# Patient Record
Sex: FEMALE | Race: BLACK OR AFRICAN AMERICAN | NOT HISPANIC OR LATINO | Employment: FULL TIME | URBAN - METROPOLITAN AREA
[De-identification: names, ages, dates, MRNs, and addresses within clinical notes are randomized per-mention and may not be internally consistent; named-entity substitution may affect disease eponyms.]

---

## 2019-02-15 ENCOUNTER — HOSPITAL ENCOUNTER (EMERGENCY)
Facility: HOSPITAL | Age: 29
Discharge: HOME | End: 2019-02-15
Attending: EMERGENCY MEDICINE | Admitting: EMERGENCY MEDICINE

## 2019-02-15 VITALS
DIASTOLIC BLOOD PRESSURE: 67 MMHG | BODY MASS INDEX: 27.42 KG/M2 | HEIGHT: 62 IN | OXYGEN SATURATION: 98 % | WEIGHT: 149 LBS | HEART RATE: 57 BPM | RESPIRATION RATE: 16 BRPM | TEMPERATURE: 99.2 F | SYSTOLIC BLOOD PRESSURE: 115 MMHG

## 2019-02-15 DIAGNOSIS — F25.9 SCHIZOAFFECTIVE DISORDER, UNSPECIFIED TYPE (HCC): Primary | ICD-10-CM

## 2019-02-15 LAB
ETHANOL BLD-MCNC: <10 MG/DL (ref 0–10)
ETHANOL UR QL: <0.01 %
HCG SERPL QL: NEGATIVE

## 2019-02-15 PROCEDURE — 99285 EMERGENCY DEPT VISIT HI MDM: CPT

## 2019-02-15 PROCEDURE — 84703 CHORIONIC GONADOTROPIN ASSAY: CPT | Performed by: EMERGENCY MEDICINE

## 2019-02-15 PROCEDURE — 80307 DRUG TEST PRSMV CHEM ANLYZR: CPT | Performed by: EMERGENCY MEDICINE

## 2019-02-15 PROCEDURE — 90791 PSYCH DIAGNOSTIC EVALUATION: CPT

## 2019-02-15 RX ORDER — OLANZAPINE 10 MG/1
10 TABLET, ORALLY DISINTEGRATING ORAL ONCE
Status: COMPLETED | OUTPATIENT
Start: 2019-02-15 | End: 2019-02-15

## 2019-02-15 RX ORDER — ARIPIPRAZOLE 10 MG/1
10 TABLET ORAL DAILY
Qty: 30 TABLET | Refills: 0 | Status: SHIPPED | OUTPATIENT
Start: 2019-02-15

## 2019-02-15 RX ORDER — SODIUM CHLORIDE 0.9 % (FLUSH) 0.9 %
10 SYRINGE (ML) INJECTION AS NEEDED
Status: DISCONTINUED | OUTPATIENT
Start: 2019-02-15 | End: 2019-02-15 | Stop reason: HOSPADM

## 2019-02-15 RX ADMIN — OLANZAPINE 10 MG: 10 TABLET, ORALLY DISINTEGRATING ORAL at 01:49

## 2019-02-15 NOTE — CONSULTS
"29 yo black female evaluated in ED (Room#9). Patient's sister and father Asaf at bedside. Patient drowsy but able to answer questions appropriately. Diagnosed with schizoaffective disorder approximately 3 years ago after starting college. Prescribed abilify which was effective but lost her job so and insurance so hasn't been able to refill it. States she has been out of it for a month. Father states patient also will quit taking meds at times due to \"feeling better\" and not wanting to be on medication. History of inpatient psychiatric treatment at [facility] and [facility]. Father states mental illness is prevalent on patient's maternal side. Patient resides with her sister and 2 cousins. Currently not working. Patient has some college.     Patient alert and oriented times 4. Pleasant, calm and cooperative. States she felt like she was having a panic attack earlier. Give zyprexa 10mg in ED which was effective. Patient denies SI. Denies HI. No psychosis. States she is currently with [facility]. Spoke with patient re importance of medication compliance. Spoke with Dr. Loaiza re plan of care. Patient does not meet criteria for inpatient psychiatric admission. Will refer back to [facility]. Patient's father and sister agreeable with taking patient home.  "

## 2019-02-15 NOTE — ED PROVIDER NOTES
" EMERGENCY DEPARTMENT ENCOUNTER    CHIEF COMPLAINT  Chief Complaint: AMS  History given by: pt's family  History limited by: non verbal  Room Number: 09/09  PMD: Provider, No Known      HPI:  Pt is a 28 y.o. female who presents complaining of AMS. Per pt's father, pt has been non compliant w/ rx for \"way longer' than a week. A week ago he noticed the pt withdrawing and seeming out of it. Pt has voiced concerns over hallucinations. Pt has hx of Scizoeffective disorder, dx given 3 years ago. Exam is limited by pt's withdrawn behavior, hx given by father.    Duration:  Longer than a week, exact unknown  Onset: gradual  Timing: constant  Location: generalized  Radiation: none  Quality: hallucinations  Intensity/Severity: moderate  Progression: worsening  Associated Symptoms: hallucinations  Aggravating Factors: noncompliance w/ rx  Alleviating Factors: none  Previous Episodes: Pt has hx of schizoaffective disorder  Treatment before arrival: EMS care    PAST MEDICAL HISTORY  Active Ambulatory Problems     Diagnosis Date Noted   • No Active Ambulatory Problems     Resolved Ambulatory Problems     Diagnosis Date Noted   • No Resolved Ambulatory Problems     No Additional Past Medical History       PAST SURGICAL HISTORY  No past surgical history on file.    FAMILY HISTORY  No family history on file.    SOCIAL HISTORY  Social History     Socioeconomic History   • Marital status: Single     Spouse name: Not on file   • Number of children: Not on file   • Years of education: Not on file   • Highest education level: Not on file   Social Needs   • Financial resource strain: Not on file   • Food insecurity - worry: Not on file   • Food insecurity - inability: Not on file   • Transportation needs - medical: Not on file   • Transportation needs - non-medical: Not on file   Occupational History   • Not on file   Tobacco Use   • Smoking status: Not on file   Substance and Sexual Activity   • Alcohol use: Not on file   • Drug use: Not " on file   • Sexual activity: Not on file   Other Topics Concern   • Not on file   Social History Narrative   • Not on file       ALLERGIES  Patient has no known allergies.    REVIEW OF SYSTEMS  Review of Systems   Unable to perform ROS: Patient nonverbal       PHYSICAL EXAM  ED Triage Vitals [02/15/19 0115]   Temp Heart Rate Resp BP SpO2   99.3 °F (37.4 °C) 120 24 132/93 97 %      Temp src Heart Rate Source Patient Position BP Location FiO2 (%)   Oral Monitor -- -- --       Physical Exam   Constitutional: She is oriented to person, place, and time. She appears distressed (agitated).   HENT:   Head: Normocephalic and atraumatic.   Eyes: EOM are normal. Pupils are equal, round, and reactive to light.   Neck: Normal range of motion. Neck supple.   Cardiovascular: Normal rate, regular rhythm and normal heart sounds.   Pulmonary/Chest: Effort normal and breath sounds normal. No respiratory distress.   Abdominal: Soft. There is no tenderness. There is no rebound and no guarding.   Musculoskeletal: Normal range of motion. She exhibits no edema.   Neurological: She is alert and oriented to person, place, and time. She has normal sensation and normal strength.   Skin: Skin is warm and dry. No rash noted.   Nursing note and vitals reviewed.      LAB RESULTS  Lab Results (last 24 hours)     Procedure Component Value Units Date/Time    Ethanol [[id number]] Collected:  02/15/19 0138    Specimen:  Blood Updated:  02/15/19 0204     Ethanol <10 mg/dL      Ethanol % <0.010 %     hCG, Serum, Qualitative [[id number]]  (Normal) Collected:  02/15/19 0138    Specimen:  Blood Updated:  02/15/19 0208     HCG Qualitative Negative          I ordered the above labs and reviewed the results.    PROCEDURES  Procedures      PROGRESS AND CONSULTS     0131- Ordered Zyprexa and IVF for sx management. Also ordered Ethanol, UDS, and hCG testing for further evaluation. Placed call out to [facility] for consult.    0314- Rechecked pt. Pt is resting  comfortably, and is now conversive, compliant, and alert. When asked why she discontinued use of medicine, she said she was out. Pt remembers her episode. Discussed the plan to have pt speak to [facility]. Pt understands and agrees with the plan, all questions answered.    0505- Rechecked pt. Pt is resting comfortably. Discussed the plan to discharge the pt home with prescriptions for Abilify, 10 mg, three day supply so that pt can f/u up w/ [facility]. I instructed the pt to take medicine as directed. Pt understands and agrees with the plan, all questions answered.        MEDICAL DECISION MAKING  Results were reviewed/discussed with the patient and they were also made aware of online access. Pt also made aware that some labs, such as cultures, will not be resulted during ER visit and follow up with PMD is necessary.     MDM  Number of Diagnoses or Management Options  Schizoaffective disorder, unspecified type (CMS/HCC):      Amount and/or Complexity of Data Reviewed  Clinical lab tests: reviewed and ordered (No remarkable findings)  Decide to obtain previous medical records or to obtain history from someone other than the patient: yes  Obtain history from someone other than the patient: yes (Pt's family)  Review and summarize past medical records: yes           DIAGNOSIS  Final diagnoses:   Schizoaffective disorder, unspecified type (CMS/HCC)       DISPOSITION  DISCHARGE    Patient discharged in stable condition.    Reviewed implications of results, diagnosis, meds, responsibility to follow up, warning signs and symptoms of possible worsening, potential complications and reasons to return to ER.    Patient/Family voiced understanding of above instructions.    Discussed plan for discharge, as there is no emergent indication for admission. Patient referred to primary care provider for BP management due to today's BP. Pt/family is agreeable and understands need for follow up and repeat testing.  Pt is aware that  discharge does not mean that nothing is wrong but it indicates no emergency is present that requires admission and they must continue care with follow-up as given below or physician of their choice.     FOLLOW-UP  [address] [address]  [phone]             Medication List      New Prescriptions    ARIPiprazole 10 MG tablet  Commonly known as:  ABILIFY  Take 1 tablet by mouth Daily.              Latest Documented Vital Signs:  As of 5:07 AM  BP- 93/57 HR- 58 Temp- 99.2 °F (37.3 °C) (Tympanic) O2 sat- 99%    --  Documentation assistance provided by josé miguel Joy for Dr. Loaiza.  Information recorded by the scribe was done at my direction and has been verified and validated by me.       Nathalie Joy  02/15/19 0250       Nathalie Joy  02/15/19 0419       Nathalie Joy  02/15/19 0504       Jasvir Loaiza MD  02/15/19 3395

## 2019-02-15 NOTE — ED TRIAGE NOTES
"To ER via EMS.  Father of pt called EMS for panic attack.  Pt was found in floor, rocking back and forth, sayIng \"I can't breath\" \"Michael\" \"uber\".  Pt has hx of schizoeffective disorder and has been off her meds for possibly 1 week per parents.  Pt was acting erratically tonight and her roommate called pt's parents to come over.     Pt would not speak to me or answer questions at triage.  "

## 2019-11-07 ENCOUNTER — HOSPITAL ENCOUNTER (EMERGENCY)
Facility: HOSPITAL | Age: 29
Discharge: HOME | End: 2019-11-07
Attending: EMERGENCY MEDICINE | Admitting: EMERGENCY MEDICINE

## 2019-11-07 ENCOUNTER — APPOINTMENT (OUTPATIENT)
Dept: GENERAL RADIOLOGY | Facility: HOSPITAL | Age: 29
End: 2019-11-07

## 2019-11-07 VITALS
HEIGHT: 60 IN | BODY MASS INDEX: 29.1 KG/M2 | DIASTOLIC BLOOD PRESSURE: 70 MMHG | TEMPERATURE: 97.7 F | SYSTOLIC BLOOD PRESSURE: 111 MMHG | HEART RATE: 63 BPM | RESPIRATION RATE: 16 BRPM | OXYGEN SATURATION: 100 %

## 2019-11-07 DIAGNOSIS — R06.00 DYSPNEA, UNSPECIFIED TYPE: Primary | ICD-10-CM

## 2019-11-07 LAB
ANION GAP SERPL CALCULATED.3IONS-SCNC: 8.5 MMOL/L (ref 5–15)
BASOPHILS # BLD AUTO: 0.02 10*3/MM3 (ref 0–0.2)
BASOPHILS NFR BLD AUTO: 0.4 % (ref 0–1.5)
BUN BLD-MCNC: 8 MG/DL (ref 6–20)
BUN/CREAT SERPL: 9 (ref 7–25)
CALCIUM SPEC-SCNC: 9.6 MG/DL (ref 8.6–10.5)
CHLORIDE SERPL-SCNC: 105 MMOL/L (ref 98–107)
CO2 SERPL-SCNC: 26.5 MMOL/L (ref 22–29)
CREAT BLD-MCNC: 0.89 MG/DL (ref 0.57–1)
D DIMER PPP FEU-MCNC: <0.27 MCGFEU/ML (ref 0–0.49)
DEPRECATED RDW RBC AUTO: 42.3 FL (ref 37–54)
EOSINOPHIL # BLD AUTO: 0.08 10*3/MM3 (ref 0–0.4)
EOSINOPHIL NFR BLD AUTO: 1.5 % (ref 0.3–6.2)
ERYTHROCYTE [DISTWIDTH] IN BLOOD BY AUTOMATED COUNT: 12.4 % (ref 12.3–15.4)
GFR SERPL CREATININE-BSD FRML MDRD: 92 ML/MIN/1.73
GLUCOSE BLD-MCNC: 101 MG/DL (ref 65–99)
HCG SERPL QL: NEGATIVE
HCT VFR BLD AUTO: 40.5 % (ref 34–46.6)
HGB BLD-MCNC: 13.6 G/DL (ref 12–15.9)
IMM GRANULOCYTES # BLD AUTO: 0.01 10*3/MM3 (ref 0–0.05)
IMM GRANULOCYTES NFR BLD AUTO: 0.2 % (ref 0–0.5)
LYMPHOCYTES # BLD AUTO: 1.74 10*3/MM3 (ref 0.7–3.1)
LYMPHOCYTES NFR BLD AUTO: 31.6 % (ref 19.6–45.3)
MCH RBC QN AUTO: 30.9 PG (ref 26.6–33)
MCHC RBC AUTO-ENTMCNC: 33.6 G/DL (ref 31.5–35.7)
MCV RBC AUTO: 92 FL (ref 79–97)
MONOCYTES # BLD AUTO: 0.43 10*3/MM3 (ref 0.1–0.9)
MONOCYTES NFR BLD AUTO: 7.8 % (ref 5–12)
NEUTROPHILS # BLD AUTO: 3.22 10*3/MM3 (ref 1.7–7)
NEUTROPHILS NFR BLD AUTO: 58.5 % (ref 42.7–76)
NRBC BLD AUTO-RTO: 0 /100 WBC (ref 0–0.2)
PLATELET # BLD AUTO: 239 10*3/MM3 (ref 140–450)
PMV BLD AUTO: 10.4 FL (ref 6–12)
POTASSIUM BLD-SCNC: 4.5 MMOL/L (ref 3.5–5.2)
RBC # BLD AUTO: 4.4 10*6/MM3 (ref 3.77–5.28)
SODIUM BLD-SCNC: 140 MMOL/L (ref 136–145)
TROPONIN T SERPL-MCNC: <0.01 NG/ML (ref 0–0.03)
WBC NRBC COR # BLD: 5.5 10*3/MM3 (ref 3.4–10.8)

## 2019-11-07 PROCEDURE — 71046 X-RAY EXAM CHEST 2 VIEWS: CPT

## 2019-11-07 PROCEDURE — 94640 AIRWAY INHALATION TREATMENT: CPT

## 2019-11-07 PROCEDURE — 93005 ELECTROCARDIOGRAM TRACING: CPT | Performed by: EMERGENCY MEDICINE

## 2019-11-07 PROCEDURE — 85379 FIBRIN DEGRADATION QUANT: CPT | Performed by: EMERGENCY MEDICINE

## 2019-11-07 PROCEDURE — 93010 ELECTROCARDIOGRAM REPORT: CPT | Performed by: INTERNAL MEDICINE

## 2019-11-07 PROCEDURE — 80048 BASIC METABOLIC PNL TOTAL CA: CPT | Performed by: EMERGENCY MEDICINE

## 2019-11-07 PROCEDURE — 85025 COMPLETE CBC W/AUTO DIFF WBC: CPT | Performed by: EMERGENCY MEDICINE

## 2019-11-07 PROCEDURE — 94799 UNLISTED PULMONARY SVC/PX: CPT

## 2019-11-07 PROCEDURE — 84703 CHORIONIC GONADOTROPIN ASSAY: CPT | Performed by: EMERGENCY MEDICINE

## 2019-11-07 PROCEDURE — 99284 EMERGENCY DEPT VISIT MOD MDM: CPT

## 2019-11-07 PROCEDURE — 84484 ASSAY OF TROPONIN QUANT: CPT | Performed by: EMERGENCY MEDICINE

## 2019-11-07 RX ORDER — ALBUTEROL SULFATE 90 UG/1
AEROSOL, METERED RESPIRATORY (INHALATION)
Qty: 1 INHALER | Refills: 0 | Status: SHIPPED | OUTPATIENT
Start: 2019-11-07

## 2019-11-07 RX ORDER — ALBUTEROL SULFATE 2.5 MG/3ML
2.5 SOLUTION RESPIRATORY (INHALATION) ONCE
Status: COMPLETED | OUTPATIENT
Start: 2019-11-07 | End: 2019-11-07

## 2019-11-07 RX ORDER — SODIUM CHLORIDE 0.9 % (FLUSH) 0.9 %
10 SYRINGE (ML) INJECTION AS NEEDED
Status: DISCONTINUED | OUTPATIENT
Start: 2019-11-07 | End: 2019-11-07 | Stop reason: HOSPADM

## 2019-11-07 RX ORDER — ASPIRIN 81 MG/1
324 TABLET, CHEWABLE ORAL ONCE
Status: COMPLETED | OUTPATIENT
Start: 2019-11-07 | End: 2019-11-07

## 2019-11-07 RX ADMIN — ALBUTEROL SULFATE 2.5 MG: 2.5 SOLUTION RESPIRATORY (INHALATION) at 15:58

## 2019-11-07 RX ADMIN — ASPIRIN 324 MG: 81 TABLET, CHEWABLE ORAL at 15:50

## 2019-11-07 NOTE — ED TRIAGE NOTES
Patient presents to er via private vehicle from home.  Patient is reporting shortness of breath at rest since Tuesday.

## 2019-11-07 NOTE — ED NOTES
"Pt to ED with c/o \"squeezing\" to chest and shortness of breath \"worse with heavy lifting\" x days, denies lung hx. denies pain present at this time, states she feels like she has been wheezing      Jesi Butts RN  11/07/19 8382    "

## 2019-11-07 NOTE — ED PROVIDER NOTES
EMERGENCY DEPARTMENT ENCOUNTER    CHIEF COMPLAINT  Chief Complaint: SOA  History given by: Patient  History limited by: nothing  Room Number: 13/13  PMD: Provider, No Known      HPI:  Pt is a 28 y.o. female who presents complaining of constant SOA that started 2 days ago. She admits to chest tightness. Pt denies cough, N/V/D, sore throat, congestion, syncope, extremity swelling, and fever. She notes that nothing alleviates or aggravates the pain. Pt states her father has Asthma but she has never been dx. She denies recent travel and chance of pregnancy.       Duration:  2 days  Onset: gradual  Timing: constant  Quality: SOA  Intensity/Severity: mild  Progression: unchanged  Associated Symptoms: chest tightness  Aggravating Factors: none  Alleviating Factors: none      PAST MEDICAL HISTORY  Active Ambulatory Problems     Diagnosis Date Noted   • No Active Ambulatory Problems     Resolved Ambulatory Problems     Diagnosis Date Noted   • No Resolved Ambulatory Problems     Past Medical History:   Diagnosis Date   • Depression        PAST SURGICAL HISTORY  History reviewed. No pertinent surgical history.    FAMILY HISTORY  History reviewed. No pertinent family history.    SOCIAL HISTORY  Social History     Socioeconomic History   • Marital status: Single     Spouse name: Not on file   • Number of children: Not on file   • Years of education: Not on file   • Highest education level: Not on file   Tobacco Use   • Smoking status: Former Smoker   Substance and Sexual Activity   • Alcohol use: Yes     Frequency: Never     Comment: occasionally    • Drug use: Yes     Types: Marijuana     Comment: socially    • Sexual activity: Defer       ALLERGIES  Patient has no known allergies.    REVIEW OF SYSTEMS  Review of Systems   Constitutional: Negative for fever.   HENT: Negative for congestion and sore throat.    Eyes: Negative.    Respiratory: Positive for chest tightness and shortness of breath. Negative for cough.     Cardiovascular: Negative for chest pain and leg swelling.   Gastrointestinal: Negative for abdominal pain, diarrhea, nausea and vomiting.   Genitourinary: Negative for dysuria.   Musculoskeletal: Negative for arthralgias and neck pain.   Skin: Negative for rash.   Allergic/Immunologic: Negative.    Neurological: Negative for syncope, weakness, numbness and headaches.   Hematological: Negative.    Psychiatric/Behavioral: Negative.    All other systems reviewed and are negative.      PHYSICAL EXAM  ED Triage Vitals [11/07/19 1502]   Temp Heart Rate Resp BP SpO2   97.7 °F (36.5 °C) 81 15 127/76 94 %      Temp src Heart Rate Source Patient Position BP Location FiO2 (%)   Tympanic Monitor -- -- --       Physical Exam   Constitutional: She is oriented to person, place, and time.  Non-toxic appearance. She does not have a sickly appearance. No distress.   HENT:   Head: Normocephalic and atraumatic.   Eyes: Conjunctivae and EOM are normal. Pupils are equal, round, and reactive to light.   Neck: Normal range of motion. Neck supple.   Cardiovascular: Normal rate, regular rhythm and normal heart sounds. Exam reveals no gallop and no friction rub.   No murmur heard.  Pulses:       Radial pulses are 2+ on the right side, and 2+ on the left side.   Pulmonary/Chest: Effort normal and breath sounds normal. No respiratory distress.   Abdominal: Soft. There is no tenderness. There is no rebound and no guarding.   Musculoskeletal: Normal range of motion. She exhibits no edema.   No tenderness noted to the BLE   Neurological: She is alert and oriented to person, place, and time. She has normal sensation and normal strength.   Skin: Skin is warm and dry. No rash noted.   Psychiatric: Mood and affect normal.   Nursing note and vitals reviewed.        LAB RESULTS  Lab Results (last 24 hours)     Procedure Component Value Units Date/Time    CBC & Differential [[id number]] Collected:  11/07/19 1550    Specimen:  Blood Updated:  11/07/19  1609    Narrative:       The following orders were created for panel order CBC & Differential.  Procedure                               Abnormality         Status                     ---------                               -----------         ------                     CBC Auto Differential[[id number]]        Normal              Final result                 Please view results for these tests on the individual orders.    Basic Metabolic Panel [[id number]]  (Abnormal) Collected:  11/07/19 1550    Specimen:  Blood Updated:  11/07/19 1628     Glucose 101 mg/dL      BUN 8 mg/dL      Creatinine 0.89 mg/dL      Sodium 140 mmol/L      Potassium 4.5 mmol/L      Chloride 105 mmol/L      CO2 26.5 mmol/L      Calcium 9.6 mg/dL      eGFR  African Amer 92 mL/min/1.73      BUN/Creatinine Ratio 9.0     Anion Gap 8.5 mmol/L     Narrative:       GFR Normal >60  Chronic Kidney Disease <60  Kidney Failure <15    D-dimer, Quantitative [[id number]]  (Normal) Collected:  11/07/19 1550    Specimen:  Blood Updated:  11/07/19 1619     D-Dimer, Quantitative <0.27 MCGFEU/mL     Narrative:       The Stago D-Dimer test used in conjunction with a clinical pretest probability (PTP) assessment model, has been approved by the FDA to rule out the presence of venous thromboembolism (VTE) in outpatients suspected of deep venous thrombosis (DVT) or pulmonary embolism (PE). The cut-off for negative predictive value is <0.50 MCGFEU/mL.    Troponin [[id number]]  (Normal) Collected:  11/07/19 1550    Specimen:  Blood Updated:  11/07/19 1628     Troponin T <0.010 ng/mL     Narrative:       Troponin T Reference Range:  <= 0.03 ng/mL-   Negative for AMI  >0.03 ng/mL-     Abnormal for myocardial necrosis.  Clinicians would have to utilize clinical acumen, EKG, Troponin and serial changes to determine if it is an Acute Myocardial Infarction or myocardial injury due to an underlying chronic condition.     hCG, Serum, Qualitative [[id number]]  (Normal) Collected:   11/07/19 1550    Specimen:  Blood Updated:  11/07/19 1614     HCG Qualitative Negative    CBC Auto Differential [[id number]]  (Normal) Collected:  11/07/19 1550    Specimen:  Blood Updated:  11/07/19 1609     WBC 5.50 10*3/mm3      RBC 4.40 10*6/mm3      Hemoglobin 13.6 g/dL      Hematocrit 40.5 %      MCV 92.0 fL      MCH 30.9 pg      MCHC 33.6 g/dL      RDW 12.4 %      RDW-SD 42.3 fl      MPV 10.4 fL      Platelets 239 10*3/mm3      Neutrophil % 58.5 %      Lymphocyte % 31.6 %      Monocyte % 7.8 %      Eosinophil % 1.5 %      Basophil % 0.4 %      Immature Grans % 0.2 %      Neutrophils, Absolute 3.22 10*3/mm3      Lymphocytes, Absolute 1.74 10*3/mm3      Monocytes, Absolute 0.43 10*3/mm3      Eosinophils, Absolute 0.08 10*3/mm3      Basophils, Absolute 0.02 10*3/mm3      Immature Grans, Absolute 0.01 10*3/mm3      nRBC 0.0 /100 WBC           I ordered the above labs and reviewed the results    RADIOLOGY  Xr Chest 2 View    Result Date: 11/7/2019  TWO VIEWS CHEST  HISTORY: Dyspnea.  FINDINGS: Two views of the chest demonstrate the heart to be within normal limits in size. There is no evidence of infiltrate, effusion or of congestive failure.        I ordered the above noted radiological studies. Interpreted by radiologist. Reviewed by me in PACS.       PROCEDURES  Procedures  EKG           EKG time: 1538  Rhythm/Rate: SR 60  P waves and IL: LISHA within normal limits  QRS, axis: Narrow QRS complex,    ST and T waves: WNL   No STEMI  QTC is within normal limits    Interpreted Contemporaneously by me, independently viewed and no previous for comparison.          PROGRESS AND CONSULTS     1634- Rechecked pt who is resting comfortably in NAD. Informed pt of the lab and imaging results. D/w pt the plan to DC with an inhaler. D/w pt the necessary reasons to [facility]. Pt understands and agrees with plan. All questions answered.          MEDICATIONS GIVEN IN ER  Medications   sodium chloride 0.9 % flush 10 mL (not administered)    albuterol (PROVENTIL) nebulizer solution 0.083% 2.5 mg/3mL (2.5 mg Nebulization Given 11/7/19 1558)   aspirin chewable tablet 324 mg (324 mg Oral Given 11/7/19 1550)         MEDICAL DECISION MAKING  Results were reviewed/discussed with the patient and they were also made aware of online access. Pt also made aware that some labs, such as cultures, will not be resulted during ER visit and follow up with PMD is necessary.     MDM  Number of Diagnoses or Management Options  Dyspnea, unspecified type:      Amount and/or Complexity of Data Reviewed  Clinical lab tests: reviewed and ordered (WBC: WNL  Troponin: negative)  Tests in the radiology section of CPT®: reviewed and ordered (CXR: Negative Acute)  Tests in the medicine section of CPT®: reviewed and ordered (See EKG procedure note.)           DIAGNOSIS  Final diagnoses:   Dyspnea, unspecified type       DISPOSITION  DISCHARGE    Patient discharged in stable condition.    Reviewed implications of results, diagnosis, meds, responsibility to follow up, warning signs and symptoms of possible worsening, potential complications and reasons to return to ER.    Patient/Family voiced understanding of above instructions.    Discussed plan for discharge, as there is no emergent indication for admission. Patient referred to primary care provider for BP management due to today's BP. Pt/family is agreeable and understands need for follow up and repeat testing.  Pt is aware that discharge does not mean that nothing is wrong but it indicates no emergency is present that requires admission and they must continue care with follow-up as given below or physician of their choice.     FOLLOW-UP  [facility] SCHEDULE ONE REFERRAL SERVICE  [facility] [address] [phone]  Call today  Call to obtain assistance with scheduling a primary care appointment         Medication List      New Prescriptions    albuterol sulfate  (90 Base) MCG/ACT inhaler  Commonly known as:   PROVENTIL HFA;VENTOLIN HFA;PROAIR HFA  Inhale 2 puffs every 4 hours when necessary wheeze, dyspnea, cough              Latest Documented Vital Signs:  As of 4:35 PM  BP- 127/76 HR- 64 Temp- 97.7 °F (36.5 °C) (Tympanic) O2 sat- 100%    --  Documentation assistance provided by josé miguel Holley for Dr. Nicohlas Do.  Information recorded by the scribe was done at my direction and has been verified and validated by me.       Elizabeth Holley  11/07/19 0105       Nicholas Do MD  11/07/19 2023

## 2021-10-15 ENCOUNTER — OFFICE VISIT (OUTPATIENT)
Dept: OBSTETRICS AND GYNECOLOGY | Facility: CLINIC | Age: 31
End: 2021-10-15

## 2021-10-15 VITALS
WEIGHT: 177.4 LBS | HEIGHT: 65 IN | DIASTOLIC BLOOD PRESSURE: 74 MMHG | BODY MASS INDEX: 29.56 KG/M2 | SYSTOLIC BLOOD PRESSURE: 100 MMHG

## 2021-10-15 DIAGNOSIS — N76.4 LABIAL ABSCESS: ICD-10-CM

## 2021-10-15 DIAGNOSIS — Z01.419 ROUTINE GYNECOLOGICAL EXAMINATION: Primary | ICD-10-CM

## 2021-10-15 DIAGNOSIS — Z11.51 ENCOUNTER FOR SCREENING FOR HUMAN PAPILLOMAVIRUS (HPV): ICD-10-CM

## 2021-10-15 DIAGNOSIS — Z11.3 SCREEN FOR STD (SEXUALLY TRANSMITTED DISEASE): ICD-10-CM

## 2021-10-15 DIAGNOSIS — Z01.419 PAP SMEAR, LOW-RISK: ICD-10-CM

## 2021-10-15 LAB
B-HCG UR QL: NEGATIVE
BILIRUB BLD-MCNC: NEGATIVE MG/DL
CLARITY, POC: CLEAR
COLOR UR: YELLOW
EXPIRATION DATE: NORMAL
GLUCOSE UR STRIP-MCNC: NEGATIVE MG/DL
INTERNAL NEGATIVE CONTROL: NEGATIVE
INTERNAL POSITIVE CONTROL: POSITIVE
KETONES UR QL: NEGATIVE
LEUKOCYTE EST, POC: NEGATIVE
Lab: 55
NITRITE UR-MCNC: NEGATIVE MG/ML
PH UR: 5 [PH] (ref 5–8)
PROT UR STRIP-MCNC: NEGATIVE MG/DL
RBC # UR STRIP: NEGATIVE /UL
SP GR UR: 1 (ref 1–1.03)
UROBILINOGEN UR QL: NORMAL

## 2021-10-15 PROCEDURE — 81025 URINE PREGNANCY TEST: CPT | Performed by: NURSE PRACTITIONER

## 2021-10-15 PROCEDURE — 3008F BODY MASS INDEX DOCD: CPT | Performed by: NURSE PRACTITIONER

## 2021-10-15 PROCEDURE — 2014F MENTAL STATUS ASSESS: CPT | Performed by: NURSE PRACTITIONER

## 2021-10-15 PROCEDURE — 99385 PREV VISIT NEW AGE 18-39: CPT | Performed by: NURSE PRACTITIONER

## 2021-10-15 PROCEDURE — 99213 OFFICE O/P EST LOW 20 MIN: CPT | Performed by: NURSE PRACTITIONER

## 2021-10-15 RX ORDER — CEPHALEXIN 500 MG/1
500 CAPSULE ORAL 2 TIMES DAILY
Qty: 14 CAPSULE | Refills: 0 | Status: SHIPPED | OUTPATIENT
Start: 2021-10-15

## 2021-10-15 NOTE — PROGRESS NOTES
"GYN Annual Exam     Chief Complaint   Patient presents with   • Gynecologic Exam     VAGINAL SWELLING       Nesha Everett is a 30 y.o. female who presents for annual well woman exam. She is a new patient. She is not sexually active. Currently using abstinence for contraception. She is happy with her contraception: Yes. Periods are regular every 28-30 days, lasting 5 days  days. Her bleeding is described as \"normal, not heavy.\"  Dysmenorrhea:none. Cyclic symptoms include back pain. No intermenstrual bleeding, spotting, or discharge.  Performing SBE: does not do     She thinks she has an aunt with ovarian cancer but she is uncertain.     She noticed some swelling and 2 \"balls or knots\" on the side of \"my private parts.\" States it was hard to walk \"because I kept hitting it.\"  She continues to say \"there was some fluid that was releasing.\" Reports the swelling has improved. Reports this started approx 1 week ago. She reports the area has improved but is still present.     HPI    OB History    No obstetric history on file.       Menarche: 15   Last Pap : 1-2 years ago   History of abnormal Pap smear: no  Family history of uterine, colon or ovarian cancer: no  Family history of breast cancer: no  History of abnormal mammogram: no  Gardasil Vaccine: Completed 3/3 per pt report     Past Medical History:   Diagnosis Date   • Depression        No past surgical history on file.      Current Outpatient Medications:   •  albuterol sulfate  (90 Base) MCG/ACT inhaler, Inhale 2 puffs every 4 hours when necessary wheeze, dyspnea, cough, Disp: 1 inhaler, Rfl: 0  •  ARIPiprazole (ABILIFY) 10 MG tablet, Take 1 tablet by mouth Daily., Disp: 30 tablet, Rfl: 0    No Known Allergies    Social History     Tobacco Use   • Smoking status: Former Smoker   • Smokeless tobacco: Not on file   Substance Use Topics   • Alcohol use: Yes     Comment: occasionally    • Drug use: Yes     Types: Marijuana     Comment: socially        No family " "history on file.    Review of Systems   Constitutional: Negative.    Respiratory: Negative.    Cardiovascular: Negative.    Gastrointestinal: Negative.    Genitourinary:        Swelling and area of drainage on (L) labia    Musculoskeletal: Negative.    Skin: Negative.    Neurological: Negative.    Psychiatric/Behavioral: Negative.        /74   Ht 165.1 cm (65\")   Wt 80.5 kg (177 lb 6.4 oz)   LMP 10/07/2021 (Exact Date)   Breastfeeding No   BMI 29.52 kg/m²     Physical Exam  Vitals reviewed.   Constitutional:       Appearance: She is well-developed.   Neck:      Thyroid: No thyromegaly.   Cardiovascular:      Rate and Rhythm: Normal rate and regular rhythm.   Pulmonary:      Effort: Pulmonary effort is normal.      Breath sounds: Normal breath sounds.   Chest:   Breasts:      Right: No inverted nipple, mass, nipple discharge, skin change or tenderness.      Left: No inverted nipple, mass, nipple discharge, skin change or tenderness.       Abdominal:      General: Bowel sounds are normal.      Palpations: Abdomen is soft.   Genitourinary:     Exam position: Supine.      Labia:         Right: No rash, tenderness, lesion or injury.         Left: Tenderness and lesion present. No rash or injury.       Vagina: No signs of injury and foreign body. No vaginal discharge, erythema, tenderness or bleeding.      Cervix: No cervical motion tenderness, discharge or friability.      Uterus: Not deviated, not enlarged, not fixed and not tender.       Adnexa:         Right: No mass, tenderness or fullness.          Left: No mass, tenderness or fullness.        Rectum: Normal.       Musculoskeletal:         General: No swelling. Normal range of motion.      Cervical back: Normal range of motion and neck supple.   Skin:     General: Skin is warm and dry.   Neurological:      General: No focal deficit present.      Mental Status: She is alert and oriented to person, place, and time.   Psychiatric:         Mood and Affect: " Mood normal.         Behavior: Behavior normal.            Assessment     1. Well woman exam   2. Contraception management  3. STI screen   4. Labial abscess     Plan   1. Well woman exam: Pap collected Yes. Instructed on how to perform SBE. Recommend MVI daily.    2. Contraception: Abstinence   3. STD: Enc condoms. Desires STD screen today- Yes. Serum and Pap   4. Smoking status: non smoekr  5.   Patient's Body mass index is 29.52 kg/m². indicating that she is overweight (BMI 25-29.9). Obesity-related health conditions include the following: obstructive sleep apnea, hypertension, coronary heart disease, diabetes mellitus, dyslipidemias and GERD. Obesity is unchanged. BMI is is above average; no BMI management plan is appropriate. We discussed portion control and increasing exercise..  6.   Labial abscess- Has improved some. ERX Keflex. Instructed on warm compresses. RTO in 1 week if no improvement, may need I&D.     RTO SHARAN Damico  10/15/2021  09:02 EDT

## 2021-10-16 LAB
HBV SURFACE AG SERPL QL IA: NEGATIVE
HCV AB S/CO SERPL IA: <0.1 S/CO RATIO (ref 0–0.9)
HIV 1+2 AB+HIV1 P24 AG SERPL QL IA: NON REACTIVE
HSV1 IGG SER IA-ACNC: <0.91 INDEX (ref 0–0.9)
HSV2 IGG SER IA-ACNC: <0.91 INDEX (ref 0–0.9)
RPR SER QL: NON REACTIVE

## 2021-10-19 LAB
C TRACH RRNA CVX QL NAA+PROBE: NEGATIVE
CONV .: NORMAL
CYTOLOGIST CVX/VAG CYTO: NORMAL
CYTOLOGY CVX/VAG DOC CYTO: NORMAL
CYTOLOGY CVX/VAG DOC THIN PREP: NORMAL
DX ICD CODE: NORMAL
HIV 1 & 2 AB SER-IMP: NORMAL
N GONORRHOEA RRNA CVX QL NAA+PROBE: NEGATIVE
OTHER STN SPEC: NORMAL
STAT OF ADQ CVX/VAG CYTO-IMP: NORMAL
T VAGINALIS RRNA SPEC QL NAA+PROBE: NEGATIVE